# Patient Record
(demographics unavailable — no encounter records)

---

## 2025-01-28 NOTE — PHYSICAL EXAM
[Alert] : alert [No Acute Distress] : no acute distress [Playful] : playful [Normocephalic] : normocephalic [Conjunctivae with no discharge] : conjunctivae with no discharge [PERRL] : PERRL [EOMI Bilateral] : EOMI bilateral [Auricles Well Formed] : auricles well formed [Clear Tympanic membranes with present light reflex and bony landmarks] : clear tympanic membranes with present light reflex and bony landmarks [No Discharge] : no discharge [Nares Patent] : nares patent [Pink Nasal Mucosa] : pink nasal mucosa [Palate Intact] : palate intact [Uvula Midline] : uvula midline [Nonerythematous Oropharynx] : nonerythematous oropharynx [No Caries] : no caries [Trachea Midline] : trachea midline [Supple, full passive range of motion] : supple, full passive range of motion [No Palpable Masses] : no palpable masses [Symmetric Chest Rise] : symmetric chest rise [Clear to Auscultation Bilaterally] : clear to auscultation bilaterally [Normoactive Precordium] : normoactive precordium [Regular Rate and Rhythm] : regular rate and rhythm [Normal S1, S2 present] : normal S1, S2 present [No Murmurs] : no murmurs [Soft] : soft [NonTender] : non tender [Non Distended] : non distended [Normoactive Bowel Sounds] : normoactive bowel sounds [No Hepatomegaly] : no hepatomegaly [No Splenomegaly] : no splenomegaly [Melchor 1] : Melchor 1 [No Clitoromegaly] : no clitoromegaly [Normal Vagina Introitus] : normal vagina introitus [Patent] : patent [Normally Placed] : normally placed [No Abnormal Lymph Nodes Palpated] : no abnormal lymph nodes palpated [Symmetric Buttocks Creases] : symmetric buttocks creases [Symmetric Hip Rotation] : symmetric hip rotation [No Gait Asymmetry] : no gait asymmetry [No pain or deformities with palpation of bone, muscles, joints] : no pain or deformities with palpation of bone, muscles, joints [Normal Muscle Tone] : normal muscle tone [No Spinal Dimple] : no spinal dimple [NoTuft of Hair] : no tuft of hair [Straight] : straight [Cranial Nerves Grossly Intact] : cranial nerves grossly intact [FreeTextEntry6] : red and irritated  [de-identified] : molluscum and dry skin

## 2025-01-28 NOTE — DISCUSSION/SUMMARY
[Normal Growth] : growth [Normal Development] : development [None] : No known medical problems [No Elimination Concerns] : elimination [No Feeding Concerns] : feeding [Normal Sleep Pattern] : sleep [No Medications] : ~He/She~ is not on any medications [Parent/Guardian] : parent/guardian [] : The components of the vaccine(s) to be administered today are listed in the plan of care. The disease(s) for which the vaccine(s) are intended to prevent and the risks have been discussed with the caretaker.  The risks are also included in the appropriate vaccination information statements which have been provided to the patient's caregiver.  The caregiver has given consent to vaccinate. [de-identified] : dry and molluscum [FreeTextEntry1] :  Patient to return for a well  appointment at 3 yo   moisturizer for skin  discussed molluscum  cream for vaginitis

## 2025-01-28 NOTE — HISTORY OF PRESENT ILLNESS
[Father] : father [whole ___ oz/d] : consumes [unfilled] oz of whole cow's milk per day [Fruit] : fruit [Vegetables] : vegetables [Meat] : meat [Grains] : grains [Eggs] : eggs [Fish] : fish [Dairy] : dairy [Vitamin] : Patient takes vitamin daily [Normal] : Normal [Brushing teeth] : Brushing teeth [Yes] : Patient goes to dentist yearly [Toothpaste] : Primary Fluoride Source: Toothpaste [In nursery school] : In nursery school [Child Cooperates] : Child cooperates [No] : Not at  exposure [Smoke Detectors] : Smoke detectors [Carbon Monoxide Detectors] : Carbon monoxide detectors [Up to date] : Up to date [NO] : No [de-identified] : likes meals not snacking . tolerates dairy - had issues as a baby  [FreeTextEntry1] : had flu 14 days ago only some runny nose now

## 2025-01-28 NOTE — BEGINNING OF VISIT
[Father] : father [Patient] : patient [FreeTextEntry1] : RUSTY NARINDER is a 3 year old here for well

## 2025-01-28 NOTE — DEVELOPMENTAL MILESTONES
[Normal Development] : Normal Development [Goes to the bathroom and urinates] : goes to bathroom and urinates by self [Plays and shares with others] : plays and shares with others [Put on coat, jacket, or shirt by self] : puts on coat, jacket, or shirt by self [Begins to play make-believe] : begins to play make-believe [Eats independently] : eats independently [Uses 3-word sentences] : uses 3-word sentences [Uses words that are 75% intelligible] : uses words that are 75% intelligible to strangers [Understands simple prepositions] : understands simple prepositions [Tells a story from a book or TV] : tells a story from a book or TV [Compares things using words such] : compares things using words such as bigger or shorter [Pedals tricycle] : pedals tricycle [Climbs on and off couch] : climbs on and off couch or chair [Jumps forward] : jumps forward [Draws a single [place]] : draws a single [place] [Draws a person with head] : draws a person with head and one other body part [Cuts with child scissor] : cuts with child scissor [Yes] : Completed.

## 2025-03-28 NOTE — CONSULT LETTER
[Dear  ___] : Dear  [unfilled], [Consult Letter:] : I had the pleasure of evaluating your patient, [unfilled]. [Please see my note below.] : Please see my note below. [Consult Closing:] : Thank you very much for allowing me to participate in the care of this patient.  If you have any questions, please do not hesitate to contact me. [Sincerely,] : Sincerely, [FreeTextEntry3] : Laurita Jane MD Pediatric Dermatology [facility]

## 2025-03-28 NOTE — PHYSICAL EXAM
[Alert] : alert [Well Nourished] : well nourished [FreeTextEntry3] : eczematous patches to bilateral dorsum of hands and bilateral popliteal fossa  flesh colored papules scattered to trunk and groin with some areas of excoriation   xerosis of skin

## 2025-03-28 NOTE — HISTORY OF PRESENT ILLNESS
[FreeTextEntry1] : NP: Eczema, molluscum  [de-identified] : Healthy 3 yo female presenting for follow up of eczema and molluscum. Slightly improved since last visit, but dad is concerned about itching. Not currently using triamcinolone.   S- Dove baby hypoallergic. Bathes everyday to every other day M- Cerave D- Tide Clear

## 2025-03-28 NOTE — CONSULT LETTER
[Dear  ___] : Dear  [unfilled], [Consult Letter:] : I had the pleasure of evaluating your patient, [unfilled]. [Please see my note below.] : Please see my note below. [Consult Closing:] : Thank you very much for allowing me to participate in the care of this patient.  If you have any questions, please do not hesitate to contact me. [Sincerely,] : Sincerely, [FreeTextEntry3] : Laurita Jane MD Pediatric Dermatology [facility]

## 2025-03-28 NOTE — HISTORY OF PRESENT ILLNESS
[FreeTextEntry1] : NP: Eczema, molluscum  [de-identified] : Healthy 3 yo female presenting for follow up of eczema and molluscum. Slightly improved since last visit, but dad is concerned about itching. Not currently using triamcinolone.   S- Dove baby hypoallergic. Bathes everyday to every other day M- Cerave D- Tide Clear

## 2025-03-28 NOTE — ASSESSMENT
[Use of independent historian: [ enter independent historian's relationship to patient ] :____] : As the patient was unable to provide a complete and reliable history, I obtained clinical history from the patient's [unfilled] [FreeTextEntry1] : #Atopic derm w xerosis -Start Triamcinolone 0.1% ointment BID PRN roughness on thin plaques on body avoid face or groin, SED  Dry skin care reviewed:   - Take short showers/baths (avoid hot water)  - Use a mild soap (eg. CeraVe cleanser or Aquaphor)  - Use soap only on areas truly needed (underarms,groin,buttocks,fold areas, feet, face, hair)  - Pat off excess water and put moisturizer on immediately (within 3 min.)              Good moisturizing choices include:                       1. Cetaphil cream (not baby Cetaphil)                       2. CeraVe cream                       3. Vanicream cream                       4. Aquaphor ointment                       5. Vaseline ointment                       6. CeraVe ointment  - A moisturizer should always be applied after showering or bathing, but may be applied as many additional times as is necessary.   #Molluscum - mom prefers to monitor  - discussed measures to limit spread, such as avoiding communal bathing and sharing of bath towels - discussed disease course is typically self-limited, but papules may persist for several months and up to 2 years before disappearing - discussed different treatment options including conservative management, cantharidin, cryotherapy, curettage, and topical Medications   - RTC PRN